# Patient Record
Sex: MALE | Race: WHITE
[De-identification: names, ages, dates, MRNs, and addresses within clinical notes are randomized per-mention and may not be internally consistent; named-entity substitution may affect disease eponyms.]

---

## 2019-07-30 ENCOUNTER — HOSPITAL ENCOUNTER (OUTPATIENT)
Dept: HOSPITAL 75 - RAD | Age: 39
End: 2019-07-30
Attending: NURSE PRACTITIONER
Payer: COMMERCIAL
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
## 2019-07-30 DIAGNOSIS — S83.242A: Primary | ICD-10-CM

## 2019-07-30 PROCEDURE — 73721 MRI JNT OF LWR EXTRE W/O DYE: CPT

## 2019-07-30 NOTE — DIAGNOSTIC IMAGING REPORT
MRI LT LOWER EXT JOINT W/O



TECHNIQUE: Multiplanar, multisequence MR imaging of the right

knee was performed without contrast.



COMPARISON: None available. 



INDICATION: Right knee pain after injury obtained playing

softball.



FINDINGS:



MENISCI 

Medial meniscus: Probable horizontal cleavage tear in the

posterior horn of the medial meniscus.

Lateral meniscus: Normal.



LIGAMENTS

ACL: Intact.

PCL: Intact.

MCL: Abnormal thickening and increased signal within and

surrounding the proximal MCL is compatible with partial-thickness

tear/high-grade sprain. The distal insertion of the MCL on the

tibia is intact.

LCL: The lateral collateral ligamentous complex is intact.



EXTENSOR MECHANISM

The extensor mechanism is intact.



CARTILAGE

Medial compartment: Medial compartment articular cartilage is

well preserved without focal high-grade chondromalacia.

Lateral compartment: The lateral compartment articular cartilage

is preserved without high-grade chondromalacia.

Patellofemoral compartment: The patellofemoral articular

cartilage is well preserved without high-grade chondromalacia.



BONE

No fracture, stress fracture or osteonecrosis. 



SOFT TISSUE

No knee effusion or Baker's cyst.



IMPRESSION: 

1. Intermediate grade sprain/partial-thickness tear of the

proximal origin of the MCL. Distal insertion of the MCL remains

intact.

2. Probable nondisplaced horizontal cleavage tear in the

posterior horn of the medial meniscus.





Dictated by: 



  Dictated on workstation # [id number]

## 2021-05-10 ENCOUNTER — HOSPITAL ENCOUNTER (OUTPATIENT)
Dept: HOSPITAL 75 - PREOP | Age: 41
LOS: 90 days | Discharge: HOME | End: 2021-08-08
Attending: SURGERY
Payer: COMMERCIAL

## 2021-05-10 DIAGNOSIS — Z01.818: Primary | ICD-10-CM

## 2022-07-13 ENCOUNTER — HOSPITAL ENCOUNTER (OUTPATIENT)
Dept: HOSPITAL 75 - PREOP | Age: 42
Discharge: HOME | End: 2022-07-13
Attending: SURGERY
Payer: COMMERCIAL

## 2022-07-13 VITALS — HEIGHT: 67.01 IN | WEIGHT: 175.93 LBS | BODY MASS INDEX: 27.61 KG/M2

## 2022-07-13 DIAGNOSIS — Z01.818: Primary | ICD-10-CM

## 2022-08-19 ENCOUNTER — HOSPITAL ENCOUNTER (OUTPATIENT)
Dept: HOSPITAL 75 - ENDO | Age: 42
Discharge: HOME | End: 2022-08-19
Attending: SURGERY
Payer: COMMERCIAL

## 2022-08-19 VITALS — DIASTOLIC BLOOD PRESSURE: 82 MMHG | SYSTOLIC BLOOD PRESSURE: 135 MMHG

## 2022-08-19 VITALS — SYSTOLIC BLOOD PRESSURE: 120 MMHG | DIASTOLIC BLOOD PRESSURE: 72 MMHG

## 2022-08-19 VITALS — HEIGHT: 67.01 IN | WEIGHT: 175.93 LBS | BODY MASS INDEX: 27.61 KG/M2

## 2022-08-19 VITALS — SYSTOLIC BLOOD PRESSURE: 125 MMHG | DIASTOLIC BLOOD PRESSURE: 73 MMHG

## 2022-08-19 VITALS — DIASTOLIC BLOOD PRESSURE: 80 MMHG | SYSTOLIC BLOOD PRESSURE: 131 MMHG

## 2022-08-19 VITALS — SYSTOLIC BLOOD PRESSURE: 111 MMHG | DIASTOLIC BLOOD PRESSURE: 78 MMHG

## 2022-08-19 DIAGNOSIS — F17.210: ICD-10-CM

## 2022-08-19 DIAGNOSIS — K44.9: ICD-10-CM

## 2022-08-19 DIAGNOSIS — K21.00: Primary | ICD-10-CM

## 2022-08-19 DIAGNOSIS — K31.89: ICD-10-CM

## 2022-08-19 DIAGNOSIS — K29.60: ICD-10-CM

## 2022-08-19 PROCEDURE — 88305 TISSUE EXAM BY PATHOLOGIST: CPT

## 2022-08-19 NOTE — ANESTHESIA-GENERAL POST-OP
MAC


Patient Condition


Mental Status/LOC:  Same as Preop


Cardiovascular:  Satisfactory


Nausea/Vomiting:  Absent


Respiratory:  Satisfactory


Pain:  Controlled


Complications:  Absent





Post Op Complications


Complications


None





Follow Up Care/Instructions


Patient Instructions


None needed.





Anesthesiology Discharge Order


Discharge Order


Patient is doing well, no complaints, stable vital signs, no apparent adverse 

anesthesia problems.   


No complications reported per nursing.











PARISH FLOR CRNA         Aug 19, 2022 13:22

## 2022-08-19 NOTE — ENDOSCOPY DISCHARGE INSTRUCT
Endo Procedure/Findings


Findings


1.:  Gastric Ulcer


2.:  Gastritis


3.:  Hiatal Hernia





Discharge Instructions


-


Activity: You might feel a little sleepy until tomorrow.  This is due to the 

medicine you received to relax you.





Until tomorrow, you should:  


   NOT drive a car, operate machinery or power tools.


   NOT drink any alcoholic beverages.


   NOT make any important decisions or sign importortant papers.





Do not return to work until tomorrow, unless otherwise instructed. Resume 

previous activities tomorrow.





Diet: Start by taking liquids.  If you tolerate liquids, advance to solid food.


1.:  EGD in 1 year





Notify Physician


-


If you experience excessive bleeding, unusual abdominal pain, fever, or chest 

pain, contact your doctor immediately.











KIMBERLY DURAN DO               Aug 19, 2022 14:10

## 2022-08-19 NOTE — PROGRESS NOTE-POST OPERATIVE
Post-Operative Progess Note


Surgeon (s)/Assistant (s)


Surgeon


KIMBERLY DURAN DO


Assistant:  none





Pre-Operative Diagnosis


Epigastric pain, Gastritis





Post-Operative Diagnosis





Gastritis with bleeding


Hiatal hernia -sliding





Procedure & Operative Findings


Date of Procedure


8/19/22


Procedure Performed/Findings


PROCEDURE NOTE:


After informed consent was obtained, the patient was brought to the endoscopy


suite, placed in bed in left lateral decubitus position.  He was administered


IV sedation by the CRNA who then monitored  vitals the entire time, heart


rate, blood pressure and pulse ox and the scope was inserted down the mouth


through the esophagus into the stomach.  Pushed into the stomach and past the 

antrum


into the duodenum.  Duodenum looked good.  Had noted some blood in the stomach


and Gastritis; gastritis with bleeding.  Pulled back and did a biopsy of


antrum, then retroflexed the scope, saw a sliding hiatal hernia (could see the 

GE


junction coming into the stomach), took a picture of this and then pulled the 

scope 


into the GE junction and then did a biopsy of the GE junction.  Pushed the scope

back


into the stomach, suctioned all the air out of the stomach. At this point pulled

the 


scope up the esophagus and out the mouth. 





The patient tolerated the procedure, and he recovered in endoscopy suite.


Anesthesia Type


IV sedation by CRNA





Estimated Blood Loss


Estimated blood loss (mL):  scant





Specimens/Packing


Specimens Removed


antral bx


body of stomach bx


GE jxn bx











KIMBERLY DURAN DO               Aug 19, 2022 13:55

## 2023-07-11 ENCOUNTER — HOSPITAL ENCOUNTER (EMERGENCY)
Dept: HOSPITAL 75 - ER | Age: 43
Discharge: HOME | End: 2023-07-11
Payer: COMMERCIAL

## 2023-07-11 VITALS — SYSTOLIC BLOOD PRESSURE: 134 MMHG | DIASTOLIC BLOOD PRESSURE: 72 MMHG

## 2023-07-11 VITALS — WEIGHT: 169.98 LBS | BODY MASS INDEX: 26.68 KG/M2 | HEIGHT: 67.01 IN
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
## 2023-07-11 DIAGNOSIS — Z23: ICD-10-CM

## 2023-07-11 DIAGNOSIS — S01.81XA: ICD-10-CM
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
## 2023-07-11 DIAGNOSIS — S42.031A: Primary | ICD-10-CM

## 2023-07-11 PROCEDURE — 73000 X-RAY EXAM OF COLLAR BONE: CPT

## 2023-07-11 PROCEDURE — 90715 TDAP VACCINE 7 YRS/> IM: CPT

## 2023-07-11 PROCEDURE — 99284 EMERGENCY DEPT VISIT MOD MDM: CPT

## 2023-07-11 NOTE — DIAGNOSTIC IMAGING REPORT
INDICATION: 

Right shoulder injury with pain.



FINDINGS:

AP and angled views of the right clavicle reveal a comminuted,

mildly displaced fracture involving the distal shaft of the right

clavicle with elevation indicating disruption of the

coracoclavicular ligament. The fracture may extend to the

articular surface of the distal clavicle without definite offset

of the joint surface.



IMPRESSION: 

Comminuted mildly displaced distal right clavicle fracture with

coracoclavicular ligamentous injury.



Dictated by: 



  Dictated on workstation # [id number]
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
## 2023-07-19 ENCOUNTER — HOSPITAL ENCOUNTER (EMERGENCY)
Dept: HOSPITAL 75 - ER | Age: 43
Discharge: HOME | End: 2023-07-19
Payer: COMMERCIAL

## 2023-07-19 VITALS — DIASTOLIC BLOOD PRESSURE: 78 MMHG | SYSTOLIC BLOOD PRESSURE: 134 MMHG

## 2023-07-19 VITALS — HEIGHT: 69.02 IN | WEIGHT: 174.17 LBS | BODY MASS INDEX: 25.8 KG/M2

## 2023-07-19 DIAGNOSIS — Z28.310: ICD-10-CM

## 2023-07-19 DIAGNOSIS — Z48.02: Primary | ICD-10-CM
